# Patient Record
Sex: MALE | Employment: FULL TIME | URBAN - METROPOLITAN AREA
[De-identification: names, ages, dates, MRNs, and addresses within clinical notes are randomized per-mention and may not be internally consistent; named-entity substitution may affect disease eponyms.]

---

## 2023-05-11 ENCOUNTER — OFFICE VISIT (OUTPATIENT)
Dept: FAMILY MEDICINE | Facility: CLINIC | Age: 38
End: 2023-05-11

## 2023-05-11 ENCOUNTER — ANCILLARY PROCEDURE (OUTPATIENT)
Dept: GENERAL RADIOLOGY | Facility: CLINIC | Age: 38
End: 2023-05-11
Attending: PHYSICIAN ASSISTANT

## 2023-05-11 ENCOUNTER — HOSPITAL ENCOUNTER (EMERGENCY)
Facility: CLINIC | Age: 38
Discharge: HOME | End: 2023-05-11

## 2023-05-11 VITALS
RESPIRATION RATE: 16 BRPM | TEMPERATURE: 98 F | SYSTOLIC BLOOD PRESSURE: 123 MMHG | DIASTOLIC BLOOD PRESSURE: 76 MMHG | OXYGEN SATURATION: 98 % | HEART RATE: 93 BPM

## 2023-05-11 DIAGNOSIS — S97.82XA CRUSHING INJURY OF LEFT FOOT, INITIAL ENCOUNTER: Primary | ICD-10-CM

## 2023-05-11 DIAGNOSIS — S90.32XA CONTUSION OF LEFT FOOT, INITIAL ENCOUNTER: ICD-10-CM

## 2023-05-11 PROCEDURE — 73630 X-RAY EXAM OF FOOT: CPT | Mod: TC | Performed by: RADIOLOGY

## 2023-05-11 PROCEDURE — 99203 OFFICE O/P NEW LOW 30 MIN: CPT | Performed by: PHYSICIAN ASSISTANT

## 2023-05-11 RX ORDER — DEXTROAMPHETAMINE SACCHARATE, AMPHETAMINE ASPARTATE, DEXTROAMPHETAMINE SULFATE AND AMPHETAMINE SULFATE 2.5; 2.5; 2.5; 2.5 MG/1; MG/1; MG/1; MG/1
10 TABLET ORAL
COMMUNITY
Start: 2023-04-08

## 2023-05-11 NOTE — PROGRESS NOTES
"Patient presents with:  Trauma: Ladder fell on his left foot      Clinical Decision Making:  Patient is treated for a foot contusion.  Patient had pain over the fourth and fifth metatarsal raise there is no break in the skin bleeding but there was slight ecchymoses and tenderness to palpation.  Conservative treatment with stiff soled shoe activity modification and decreased weightbearing on the left foot for comfort and support.  Return to clinic in 2 weeks for reevaluation and treatment if not getting 100% resolution or return to 100% pain-free activities.  Questions were answered to patient's satisfaction before discharge.      ICD-10-CM    1. Crushing injury of left foot, initial encounter  S97.82XA XR Foot Left G/E 3 Views      2. Contusion of left foot, initial encounter  S90.32XA XR Foot Left G/E 3 Views          Patient Instructions   Ice topically to the area 20 minutes on each hour while awake.  Take precautions to avoid damage to the skin.  After 2 days, transition to ice topically 20 minutes 3 times per day.  After 2 days may add heat and alternate ice and heat.  Ibuprofen 600 mg (3 tablets) 3 times a day with food for analgesia and anti-inflammatory effect.  Do not use the ibuprofen if you have contraindications to using NSAIDs.  Injuries to the extremities may be elevated above level of the heart continuously for the first 2 days as much as possible.  Use of stiff soled shoe for comfort and support while up and active.  Elevate the foot above the level of the heart continuously is much as possible over the first 2 days with \"toes above the nose\".  Return to see primary care provider or return to clinic for reexamination and eva-ray in 2 weeks if anything less than 100% resolution or return to pain-free weightbearing and full activities.:          HPI:  Malik Wilcox is a 37 year old male who presents today for a contusion to the left foot.  Patient states he was moving a ladder when the ladder " restraining clips were removed and the middle portion of the ladder came down and struck the left forefoot.  Patient was wearing boots at the time.  Pain during toe off during gait with weightbearing and he has approximately 50% weightbearing on the left foot.  At this time there is no involvement of the other foot or the ipsilateral ankle or knee.    History obtained from chart review and the patient.    Problem List:  2010-10: CARDIOVASCULAR SCREENING; LDL GOAL LESS THAN 160      No past medical history on file.    Social History     Tobacco Use     Smoking status: Every Day     Smokeless tobacco: Not on file     Tobacco comments:     occasional   Vaping Use     Vaping status: Not on file   Substance Use Topics     Alcohol use: Yes       Review of Systems  As above in HPI otherwise negative.    Vitals:    05/11/23 1816   BP: 123/76   Pulse: 93   Resp: 16   Temp: 98  F (36.7  C)   TempSrc: Oral   SpO2: 98%       General: Patient is resting comfortably no acute distress is afebrile  HEENT: Head is normocephalic atraumatic   Skin: Without rash non-diaphoretic  Musculoskeletal:  Left foot does not have break in the skin or bleeding but there is noted ecchymoses over the fourth and fifth metatarsal rays.  There is tenderness to palpation over the midshaft to proximal portion of the shaft of the fourth and fifth metatarsals.  No pain over the Lisfranc ligament no noted swelling or bruising no plantar surface of the foot bruising.  Stabilization of the calcaneus and abduction of the forefoot does not cause forefoot pain  Ankle is nontender palpation full range of motion without effusion.    Physical Exam      Labs:  Results for orders placed or performed in visit on 05/11/23   XR Foot Left G/E 3 Views     Status: None    Narrative    EXAM: XR FOOT LEFT G/E 3 VIEWS  LOCATION: [facility]  DATE/TIME: 5/11/2023 6:46 PM CDT    INDICATION: Crush injur to the left foot tenderness to palpation over the  fourth and fifth metatarsal  COMPARISON: None.      Impression    IMPRESSION: The left foot is negative for fracture. No dislocation.     I have personally ordered and preliminarily reviewed the following xray, I have noted no acute fractures or dislocations    At the end of the encounter, I discussed results, diagnosis, medications. Discussed red flags for immediate return to clinic/ER, as well as indications for follow up if no improvement. Patient understood and agreed to plan. Patient was stable for discharge.

## 2023-05-11 NOTE — PATIENT INSTRUCTIONS
"Ice topically to the area 20 minutes on each hour while awake.  Take precautions to avoid damage to the skin.  After 2 days, transition to ice topically 20 minutes 3 times per day.  After 2 days may add heat and alternate ice and heat.  Ibuprofen 600 mg (3 tablets) 3 times a day with food for analgesia and anti-inflammatory effect.  Do not use the ibuprofen if you have contraindications to using NSAIDs.  Injuries to the extremities may be elevated above level of the heart continuously for the first 2 days as much as possible.  Use of stiff soled shoe for comfort and support while up and active.  Elevate the foot above the level of the heart continuously is much as possible over the first 2 days with \"toes above the nose\".  Return to see primary care provider or return to clinic for reexamination and eva-ray in 2 weeks if anything less than 100% resolution or return to pain-free weightbearing and full activities.:      "